# Patient Record
Sex: MALE | Race: WHITE | NOT HISPANIC OR LATINO | URBAN - METROPOLITAN AREA
[De-identification: names, ages, dates, MRNs, and addresses within clinical notes are randomized per-mention and may not be internally consistent; named-entity substitution may affect disease eponyms.]

---

## 2020-01-06 ENCOUNTER — COMMUNICATION - HEALTHEAST (OUTPATIENT)
Dept: TELEHEALTH | Facility: CLINIC | Age: 35
End: 2020-01-06

## 2020-01-06 ENCOUNTER — OFFICE VISIT - HEALTHEAST (OUTPATIENT)
Dept: FAMILY MEDICINE | Facility: CLINIC | Age: 35
End: 2020-01-06

## 2020-01-06 DIAGNOSIS — R09.81 CHRONIC NASAL CONGESTION: ICD-10-CM

## 2020-01-06 DIAGNOSIS — E66.09 CLASS 2 OBESITY DUE TO EXCESS CALORIES WITHOUT SERIOUS COMORBIDITY WITH BODY MASS INDEX (BMI) OF 35.0 TO 35.9 IN ADULT: ICD-10-CM

## 2020-01-06 DIAGNOSIS — E66.812 CLASS 2 OBESITY DUE TO EXCESS CALORIES WITHOUT SERIOUS COMORBIDITY WITH BODY MASS INDEX (BMI) OF 35.0 TO 35.9 IN ADULT: ICD-10-CM

## 2020-01-06 DIAGNOSIS — Z13.220 SCREENING CHOLESTEROL LEVEL: ICD-10-CM

## 2020-01-06 DIAGNOSIS — R00.2 PALPITATIONS: ICD-10-CM

## 2020-01-06 DIAGNOSIS — Z00.01 ENCOUNTER FOR ROUTINE ADULT HEALTH EXAMINATION WITH ABNORMAL FINDINGS: ICD-10-CM

## 2020-01-06 LAB
ANION GAP SERPL CALCULATED.3IONS-SCNC: 9 MMOL/L (ref 5–18)
BUN SERPL-MCNC: 20 MG/DL (ref 8–22)
CALCIUM SERPL-MCNC: 9.5 MG/DL (ref 8.5–10.5)
CHLORIDE BLD-SCNC: 104 MMOL/L (ref 98–107)
CO2 SERPL-SCNC: 28 MMOL/L (ref 22–31)
CREAT SERPL-MCNC: 0.9 MG/DL (ref 0.7–1.3)
ERYTHROCYTE [DISTWIDTH] IN BLOOD BY AUTOMATED COUNT: 11.5 % (ref 11–14.5)
GFR SERPL CREATININE-BSD FRML MDRD: >60 ML/MIN/1.73M2
GLUCOSE BLD-MCNC: 81 MG/DL (ref 70–125)
HCT VFR BLD AUTO: 51.6 % (ref 40–54)
HGB BLD-MCNC: 17.1 G/DL (ref 14–18)
MCH RBC QN AUTO: 30.1 PG (ref 27–34)
MCHC RBC AUTO-ENTMCNC: 33.1 G/DL (ref 32–36)
MCV RBC AUTO: 91 FL (ref 80–100)
PLATELET # BLD AUTO: 195 THOU/UL (ref 140–440)
PMV BLD AUTO: 9 FL (ref 7–10)
POTASSIUM BLD-SCNC: 4.7 MMOL/L (ref 3.5–5)
RBC # BLD AUTO: 5.66 MILL/UL (ref 4.4–6.2)
SODIUM SERPL-SCNC: 141 MMOL/L (ref 136–145)
TSH SERPL DL<=0.005 MIU/L-ACNC: 1.14 UIU/ML (ref 0.3–5)
WBC: 7.8 THOU/UL (ref 4–11)

## 2020-01-06 ASSESSMENT — MIFFLIN-ST. JEOR: SCORE: 2109.78

## 2020-01-07 ENCOUNTER — AMBULATORY - HEALTHEAST (OUTPATIENT)
Dept: LAB | Facility: CLINIC | Age: 35
End: 2020-01-07

## 2020-01-07 DIAGNOSIS — Z13.220 SCREENING CHOLESTEROL LEVEL: ICD-10-CM

## 2020-01-07 LAB
CHOLEST SERPL-MCNC: 198 MG/DL
FASTING STATUS PATIENT QL REPORTED: YES
HDLC SERPL-MCNC: 57 MG/DL
LDLC SERPL CALC-MCNC: 126 MG/DL
TRIGL SERPL-MCNC: 73 MG/DL

## 2020-01-16 ENCOUNTER — OFFICE VISIT - HEALTHEAST (OUTPATIENT)
Dept: CARDIOLOGY | Facility: CLINIC | Age: 35
End: 2020-01-16

## 2020-01-16 DIAGNOSIS — R55 NEAR SYNCOPE: ICD-10-CM

## 2020-01-16 DIAGNOSIS — R00.2 PALPITATIONS: ICD-10-CM

## 2020-01-16 DIAGNOSIS — R00.0 TACHYCARDIA: ICD-10-CM

## 2020-01-16 LAB
ATRIAL RATE - MUSE: 76 BPM
DIASTOLIC BLOOD PRESSURE - MUSE: NORMAL
INTERPRETATION ECG - MUSE: NORMAL
P AXIS - MUSE: 66 DEGREES
PR INTERVAL - MUSE: 154 MS
QRS DURATION - MUSE: 86 MS
QT - MUSE: 364 MS
QTC - MUSE: 409 MS
R AXIS - MUSE: 30 DEGREES
SYSTOLIC BLOOD PRESSURE - MUSE: NORMAL
T AXIS - MUSE: 19 DEGREES
VENTRICULAR RATE- MUSE: 76 BPM

## 2020-01-28 ENCOUNTER — HOSPITAL ENCOUNTER (OUTPATIENT)
Dept: CARDIOLOGY | Facility: HOSPITAL | Age: 35
Discharge: HOME | End: 2020-01-28
Attending: INTERNAL MEDICINE

## 2020-01-28 DIAGNOSIS — R00.2 PALPITATIONS: ICD-10-CM

## 2020-01-28 DIAGNOSIS — R55 NEAR SYNCOPE: ICD-10-CM

## 2020-01-28 DIAGNOSIS — R00.0 TACHYCARDIA: ICD-10-CM

## 2020-01-28 LAB
AORTIC ROOT: 3.6 CM
AORTIC VALVE MEAN VELOCITY: 87.3 CM/S
AV CUSP SEPERATION: 2.5 CM
AV CUSP SEPERATION: 2.5 CM
AV DIMENSIONLESS INDEX VTI: 1
AV MEAN GRADIENT: 3 MMHG
AV PEAK GRADIENT: 6.2 MMHG
AV VALVE AREA: 4.3 CM2
AV VELOCITY RATIO: 1
BSA FOR ECHO PROCEDURE: 2.4 M2
CV ECHO HEIGHT: 70.8 IN
CV ECHO WEIGHT: 255 LBS
DOP CALC AO PEAK VEL: 124 CM/S
DOP CALC AO VTI: 25.7 CM
DOP CALC LVOT AREA: 4.15 CM2
DOP CALC LVOT DIAMETER: 2.3 CM
DOP CALC LVOT PEAK VEL: 124 CM/S
DOP CALC LVOT STROKE VOLUME: 110.5 CM3
DOP CALC MV VTI: 24 CM
DOP CALCLVOT PEAK VEL VTI: 26.6 CM
EJECTION FRACTION: 60 % (ref 55–75)
FRACTIONAL SHORTENING: 29.3 % (ref 28–44)
INTERVENTRICULAR SEPTUM IN END DIASTOLE: 1.05 CM (ref 0.6–1)
IVS/PW RATIO: 1.1
LA AREA 1: 17.9 CM2
LA AREA 2: 19.8 CM2
LEFT ATRIUM LENGTH: 5.3 CM
LEFT ATRIUM SIZE: 4.3 CM
LEFT ATRIUM VOLUME INDEX: 23.7 ML/M2
LEFT ATRIUM VOLUME: 56.8 ML
LEFT VENTRICLE CARDIAC INDEX: 2.9 L/MIN/M2
LEFT VENTRICLE CARDIAC OUTPUT: 6.8 L/MIN
LEFT VENTRICLE DIASTOLIC VOLUME INDEX: 27.8 CM3/M2 (ref 34–74)
LEFT VENTRICLE DIASTOLIC VOLUME: 66.8 CM3 (ref 62–150)
LEFT VENTRICLE HEART RATE: 62 BPM
LEFT VENTRICLE MASS INDEX: 66.4 G/M2
LEFT VENTRICLE SYSTOLIC VOLUME INDEX: 11.2 CM3/M2 (ref 11–31)
LEFT VENTRICLE SYSTOLIC VOLUME: 26.9 CM3 (ref 21–61)
LEFT VENTRICULAR INTERNAL DIMENSION IN DIASTOLE: 4.54 CM (ref 4.2–5.8)
LEFT VENTRICULAR INTERNAL DIMENSION IN SYSTOLE: 3.21 CM (ref 2.5–4)
LEFT VENTRICULAR MASS: 159.4 G
LEFT VENTRICULAR OUTFLOW TRACT MEAN GRADIENT: 3 MMHG
LEFT VENTRICULAR OUTFLOW TRACT MEAN VELOCITY: 88 CM/S
LEFT VENTRICULAR OUTFLOW TRACT PEAK GRADIENT: 6 MMHG
LEFT VENTRICULAR POSTERIOR WALL IN END DIASTOLE: 0.99 CM (ref 0.6–1)
LV STROKE VOLUME INDEX: 46 ML/M2
MITRAL VALVE DECELERATION SLOPE: 2720 MM/S2
MITRAL VALVE E/A RATIO: 1.9
MITRAL VALVE MEAN INFLOW VELOCITY: 39.3 CM/S
MITRAL VALVE PEAK VELOCITY: 91.4 CM/S
MITRAL VALVE PRESSURE HALF-TIME: 74 MS
MV AREA VTI: 4.6 CM2
MV AVERAGE E/E' RATIO: 6.3 CM/S
MV DECELERATION TIME: 162 MS
MV E'TISSUE VEL-LAT: 15.6 CM/S
MV E'TISSUE VEL-MED: 10.7 CM/S
MV LATERAL E/E' RATIO: 5.3
MV MEAN GRADIENT: 1 MMHG
MV MEDIAL E/E' RATIO: 7.7
MV PEAK A VELOCITY: 43.2 CM/S
MV PEAK E VELOCITY: 82.9 CM/S
MV PEAK GRADIENT: 3.3 MMHG
MV VALVE AREA BY CONTINUITY EQUATION: 4.6 CM2
MV VALVE AREA PRESSURE 1/2 METHOD: 3 CM2
NUC REST DIASTOLIC VOLUME INDEX: 4080 LBS
NUC REST SYSTOLIC VOLUME INDEX: 70.75 IN
TRICUSPID VALVE ANULAR PLANE SYSTOLIC EXCURSION: 2.7 CM

## 2020-01-28 ASSESSMENT — MIFFLIN-ST. JEOR: SCORE: 2114.83

## 2020-01-29 ENCOUNTER — COMMUNICATION - HEALTHEAST (OUTPATIENT)
Dept: CARDIOLOGY | Facility: CLINIC | Age: 35
End: 2020-01-29

## 2020-10-14 ENCOUNTER — COMMUNICATION - HEALTHEAST (OUTPATIENT)
Dept: FAMILY MEDICINE | Facility: CLINIC | Age: 35
End: 2020-10-14

## 2021-02-26 ENCOUNTER — OFFICE VISIT - HEALTHEAST (OUTPATIENT)
Dept: FAMILY MEDICINE | Facility: CLINIC | Age: 36
End: 2021-02-26

## 2021-02-26 DIAGNOSIS — R00.2 PALPITATIONS: ICD-10-CM

## 2021-02-26 DIAGNOSIS — J34.89 NASAL OBSTRUCTION: ICD-10-CM

## 2021-02-26 DIAGNOSIS — R03.0 ELEVATED BLOOD PRESSURE READING WITHOUT DIAGNOSIS OF HYPERTENSION: ICD-10-CM

## 2021-02-26 LAB
ALBUMIN SERPL-MCNC: 4.3 G/DL (ref 3.5–5)
ALP SERPL-CCNC: 62 U/L (ref 45–120)
ALT SERPL W P-5'-P-CCNC: 56 U/L (ref 0–45)
ANION GAP SERPL CALCULATED.3IONS-SCNC: 9 MMOL/L (ref 5–18)
AST SERPL W P-5'-P-CCNC: 28 U/L (ref 0–40)
BASOPHILS # BLD AUTO: 0.1 THOU/UL (ref 0–0.2)
BASOPHILS NFR BLD AUTO: 1 % (ref 0–2)
BILIRUB SERPL-MCNC: 0.9 MG/DL (ref 0–1)
BUN SERPL-MCNC: 19 MG/DL (ref 8–22)
CALCIUM SERPL-MCNC: 9.7 MG/DL (ref 8.5–10.5)
CHLORIDE BLD-SCNC: 102 MMOL/L (ref 98–107)
CO2 SERPL-SCNC: 28 MMOL/L (ref 22–31)
CREAT SERPL-MCNC: 0.84 MG/DL (ref 0.7–1.3)
EOSINOPHIL # BLD AUTO: 0.2 THOU/UL (ref 0–0.4)
EOSINOPHIL NFR BLD AUTO: 3 % (ref 0–6)
ERYTHROCYTE [DISTWIDTH] IN BLOOD BY AUTOMATED COUNT: 12.1 % (ref 11–14.5)
GFR SERPL CREATININE-BSD FRML MDRD: >60 ML/MIN/1.73M2
GLUCOSE BLD-MCNC: 83 MG/DL (ref 70–125)
HCT VFR BLD AUTO: 52.5 % (ref 40–54)
HGB BLD-MCNC: 18.5 G/DL (ref 14–18)
IMM GRANULOCYTES # BLD: 0 THOU/UL
IMM GRANULOCYTES NFR BLD: 0 %
LYMPHOCYTES # BLD AUTO: 1.4 THOU/UL (ref 0.8–4.4)
LYMPHOCYTES NFR BLD AUTO: 19 % (ref 20–40)
MCH RBC QN AUTO: 29.4 PG (ref 27–34)
MCHC RBC AUTO-ENTMCNC: 35.2 G/DL (ref 32–36)
MCV RBC AUTO: 84 FL (ref 80–100)
MONOCYTES # BLD AUTO: 0.7 THOU/UL (ref 0–0.9)
MONOCYTES NFR BLD AUTO: 10 % (ref 2–10)
NEUTROPHILS # BLD AUTO: 5.1 THOU/UL (ref 2–7.7)
NEUTROPHILS NFR BLD AUTO: 68 % (ref 50–70)
PLATELET # BLD AUTO: 221 THOU/UL (ref 140–440)
PMV BLD AUTO: 10.8 FL (ref 7–10)
POTASSIUM BLD-SCNC: 4.8 MMOL/L (ref 3.5–5)
PROT SERPL-MCNC: 7.3 G/DL (ref 6–8)
RBC # BLD AUTO: 6.29 MILL/UL (ref 4.4–6.2)
SODIUM SERPL-SCNC: 139 MMOL/L (ref 136–145)
TSH SERPL DL<=0.005 MIU/L-ACNC: 1.62 UIU/ML (ref 0.3–5)
WBC: 7.5 THOU/UL (ref 4–11)

## 2021-02-26 ASSESSMENT — MIFFLIN-ST. JEOR: SCORE: 2109.73

## 2021-03-01 LAB — TESTOST SERPL-MCNC: 635 NG/DL (ref 240–871)

## 2021-06-04 VITALS
HEART RATE: 85 BPM | OXYGEN SATURATION: 96 % | SYSTOLIC BLOOD PRESSURE: 108 MMHG | DIASTOLIC BLOOD PRESSURE: 62 MMHG | BODY MASS INDEX: 35.89 KG/M2 | WEIGHT: 255.13 LBS | RESPIRATION RATE: 16 BRPM

## 2021-06-04 VITALS — WEIGHT: 255 LBS | HEIGHT: 71 IN | BODY MASS INDEX: 35.7 KG/M2

## 2021-06-04 VITALS
HEIGHT: 71 IN | WEIGHT: 254.06 LBS | RESPIRATION RATE: 16 BRPM | BODY MASS INDEX: 35.57 KG/M2 | HEART RATE: 72 BPM | SYSTOLIC BLOOD PRESSURE: 122 MMHG | TEMPERATURE: 98.8 F | DIASTOLIC BLOOD PRESSURE: 70 MMHG

## 2021-06-05 VITALS
BODY MASS INDEX: 35.42 KG/M2 | DIASTOLIC BLOOD PRESSURE: 82 MMHG | OXYGEN SATURATION: 96 % | SYSTOLIC BLOOD PRESSURE: 132 MMHG | WEIGHT: 253 LBS | HEART RATE: 76 BPM | HEIGHT: 71 IN

## 2021-06-15 NOTE — PROGRESS NOTES
" Patient ID: Joao Blood is a 35 y.o. male.  /82   Pulse 76   Ht 5' 11\" (1.803 m)   Wt (!) 253 lb (114.8 kg)   SpO2 96%   BMI 35.29 kg/m      Assessment/Plan:                   Diagnoses and all orders for this visit:    Nasal obstruction  -     Ambulatory referral to ENT    Palpitations  -     Comprehensive Metabolic Panel  -     HM1(CBC and Differential)  -     Thyroid Cascade  -     Testosterone, Total    Elevated blood pressure reading without diagnosis of hypertension  -     Testosterone, Total    Other orders  -     Cancel: Lipid Cascade  -     Cancel: Glucose           DISCUSSION  He would likely benefit tremendously from having further evaluation and treatment of his PTSD which she will plan to do through the [facility].  Discussed options for pharmacologic therapy for mental health concerns and/or blood pressure concerns.  Will not pursue that at this time.  Discussed obtaining basic laboratory work as noted above to make sure that there is not pathology present where we should intervene in a specific manner.  I think it is unlikely that there is a significant underlying metabolic concern.  Discussed that we will need to engage in further work-up should he continue to have these episodes.  High suspicion that episodes are a stress response based on his description.    Referral to ENT for likely fixed nasal obstruction, possibly polyp although no confirmation on exam today.  Subjective:     HPI    Joao Blood is a 35 y.o. male is here today to discuss some ongoing concerns related to his health.  He is a   describes that he has had a total of 9 deployments since 2006.  He has been in the [employer] and is currently in [place]  [employer].    He reports he has had nasal congestion that has been ongoing now since his last appointment.  He reports it seems to be a fixed obstruction in his nasal passageway on the left.  He states he was advised to use an antihistamine or nasal spray " "which was not helpful.  He would like to have this further evaluated.    He reports episodes of hypertension, feeling of anxiety, sometimes lightheadedness.  Symptoms sometimes last for several days.  He describes having a first episode in approximately 2013 and then having another episode that was significant in January 2020.  He saw Dr. Perez who ordered laboratory testing, an echocardiogram, and EKG as well as a month-long cardiac monitor.  No definitive pathology was found.  Patient is concerned by these episodes.  He does describe having a feeling of anxiety at times.  He describes to me that he was diagnosed with PTSD at the [facility].  He is currently not undergoing any type of active treatment.  He was in the past placed on propranolol for similar type of symptoms but he did not stick with the medication.  Reviewed his clinical course in great depth and discussed options for moving forward.    Review of Systems  Complete review of systems is obtained.  Other than the specific considerations noted above complete review of systems is negative.          Objective:   Medications:  Current Outpatient Medications   Medication Sig     cholecalciferol, vitamin D3, (VITAMIN D3) 25 mcg (1,000 unit) capsule Take 1,000 Units by mouth daily.     multivitamin (MULTIPLE VITAMIN ESSENTIAL ORAL) Take by mouth daily.       Allergies:  No Known Allergies    Tobacco:   reports that he has quit smoking. His smoking use included cigars. He has never used smokeless tobacco.     Physical Exam          /82   Pulse 76   Ht 5' 11\" (1.803 m)   Wt (!) 253 lb (114.8 kg)   SpO2 96%   BMI 35.29 kg/m            General Appearance:    Alert, cooperative, no distress   Eyes:   No scleral icterus or conjunctival irritation       Ears:    Normal TM's and external ear canals, both ears   Throat:   Lips, mucosa, and tongue normal; teeth and gums normal   Neck:   Supple, symmetrical, trachea midline, no adenopathy;        " thyroid:  No enlargement/tenderness/nodules   Lungs:     Clear to auscultation bilaterally, respirations unlabored, no wheezes or crackles   Heart:    Regular rate and rhythm,  No murmur   Extremities:  No edema, no joint swelling or redness, no evidence of any injuries   Skin:  No concerning skin findings, no suspicious moles, no rashes   Neurologic:  On gross examination there is no motor or sensory deficit.  Patient walks with a normal gait

## 2021-06-16 PROBLEM — E66.09 CLASS 2 OBESITY DUE TO EXCESS CALORIES WITHOUT SERIOUS COMORBIDITY WITH BODY MASS INDEX (BMI) OF 35.0 TO 35.9 IN ADULT: Status: ACTIVE | Noted: 2020-01-06

## 2021-06-16 PROBLEM — R55 NEAR SYNCOPE: Status: ACTIVE | Noted: 2020-01-16

## 2021-06-16 PROBLEM — R00.2 PALPITATIONS: Status: ACTIVE | Noted: 2020-01-16

## 2021-06-16 PROBLEM — E66.812 CLASS 2 OBESITY DUE TO EXCESS CALORIES WITHOUT SERIOUS COMORBIDITY WITH BODY MASS INDEX (BMI) OF 35.0 TO 35.9 IN ADULT: Status: ACTIVE | Noted: 2020-01-06

## 2021-06-16 PROBLEM — R00.0 TACHYCARDIA: Status: ACTIVE | Noted: 2020-01-16

## 2021-06-16 NOTE — TELEPHONE ENCOUNTER
Telephone Encounter by Hattie Lee RN at 2/6/2020 11:49 AM     Author: Hattie Lee RN Service: -- Author Type: Registered Nurse    Filed: 2/6/2020 11:53 AM Encounter Date: 1/29/2020 Status: Signed    : Hattie Lee RN (Registered Nurse)         Patient contacted with echo results today, he denies s/s of dizziness, lightheadedness, palpitations or presyncope. Reports running daily without issues noted. He will continue to wear the SETH monitor through 2/28, he was advised that he would be contacted with analysis /recommendations when available. sk/RN                  Marco Galvin MD Blackledge, Shayy J, RN   Caller: Unspecified (1 week ago)             Okay.  Thanks, will await final report.    Has he been having any symptoms?    Thanks,    Marco

## 2021-06-16 NOTE — TELEPHONE ENCOUNTER
Telephone Encounter by Shayy Loomis RN at 1/29/2020  8:52 AM     Author: Shayy Loomis RN Service: -- Author Type: Registered Nurse    Filed: 1/29/2020  8:59 AM Encounter Date: 1/29/2020 Status: Signed    : Shayy Loomis RN (Registered Nurse)       Contacted patient to review symptoms for [employer] Notification Event. Patient reports he triggered monitor to capture heart rate during his run yesterday afternoon. He denies any symptoms. Strips below are for your review:

## 2021-06-27 ENCOUNTER — HEALTH MAINTENANCE LETTER (OUTPATIENT)
Age: 36
End: 2021-06-27

## 2021-06-28 NOTE — PROGRESS NOTES
Progress Notes by Celso Karimi DO at 1/6/2020  1:40 PM     Author: Celso Karimi DO Service: -- Author Type: Physician    Filed: 1/6/2020  3:11 PM Encounter Date: 1/6/2020 Status: Signed    : Celso Karimi DO (Physician)       MALE PREVENTATIVE EXAM    Assessment and Plan:       1. Encounter for routine adult health examination with abnormal findings  I encouraged him to eat healthy foods and keep getting regular exercise.  He is going to return to the clinic for fasting cholesterol panel.  He is up-to-date on his immunizations.    2. Palpitations  I expressed my concern about the episodic heart palpitations and near syncope symptoms he has been experiencing.  The underlying cause is unclear, but this could be due to atrial fibrillation.  I recommended that we check the labs as listed below and he was given a referral to cardiology.  I anticipate they will want to do some type of heart monitor testing as part of his work-up.  - Basic Metabolic Panel  - HM2(CBC w/o Differential)  - Thyroid [place]  - Ambulatory referral to Cardiology    3. Chronic nasal congestion  We discussed his chronic nasal congestion symptoms that he has been experiencing since being deployed in [facility].  I recommended he try Flonase.  If symptoms do not improve he will let me know and we will consider referring to an ENT specialist.  - fluticasone propionate (FLONASE ALLERGY RELIEF) 50 mcg/actuation nasal spray; 1 spray into each nostril daily.  Dispense: 16 g; Refill: 2    4. Class 2 obesity due to excess calories without serious comorbidity with body mass index (BMI) of 35.0 to 35.9 in adult  He will continue to work on eating healthy foods and getting regular exercise.    5. Screening cholesterol level  He is going to return to the clinic for fasting cholesterol panel.  - Lipid Cascade; Future     Next follow up:  No follow-ups on file.    Immunization Review  Adult Imm Review: No  immunizations due today        I discussed the following with the patient:   Adult Healthy Living: Importance of regular exercise  Healthy nutrition        Subjective:   Chief Complaint: Joao Blood is an 34 y.o. male here for a preventative health visit.     HPI: He denies concerns regarding hearing, vision, urination, bowel movements, sleep or mood.  He is primarily concerned today about episodic heart palpitations he has been experiencing over the past year.  He reports having random spikes in his heart rate that seem to occur out of the blue.  Sometimes this is associated with a feeling that he may pass out.  He first noticed a couple of episodes like this in 2013 which went away on their own.  This past year he was stationed in [facility] and had a spell during a meeting.  He reports feeling awful over the subsequent couple of days.  He saw  to the  who ordered blood tests and started him on propranolol because his blood pressure was running high.  He reports that the work-up was negative and he ended up stopping the propranolol.  He states that his blood pressure will run anywhere from the 70s and then jump up into the 120s-130s.  He is able to monitor this with his watch.  This past August he had another spell.  At that time he had a normal EKG.  He is not having shortness of breath symptoms or chest pain.  He is still able to exercise without difficulty.  He does not feel like stress or dehydration are contributing to these symptoms.    He is also concerned about chronic nasal congestion symptoms he has been experiencing since returning from [facility] in May 2019.  He reports that several of his colleagues have had similar symptoms.  He has not tried any nasal sprays or oral medications for this.  He denies having history of allergies.    Social history: , no children.  He is currently in the [employer].  He used to be in the [employer].  He also works as a [id number] work.    Healthy  "Habits  Are you taking a daily aspirin? No  Do you typically exercising at least 40 min, 3-4 times per week?  Yes  Do you usually eat at least 4 servings of fruit and vegetables a day, include whole grains and fiber and avoid regularly eating high fat foods? Yes  Have you had an eye exam in the past two years? NO  Do you see a dentist twice per year? Yes  Do you have any concerns regarding sleep? No    Safety Screen  If you own firearms, are they secured in a locked gun cabinet or with trigger locks? Yes  Do you feel you are safe where you are living?: Yes (1/6/2020  1:46 PM)  Do you feel you are safe in your relationship(s)?: Yes (1/6/2020  1:46 PM)      Review of Systems:  Please see above.  The rest of the review of systems are negative for all systems.     Cancer Screening     Patient has no health maintenance due at this time              History     Reviewed By Date/Time Sections Reviewed    Jade Hein CMA 1/6/2020  1:47 PM Tobacco            Objective:   Vital Signs:   Visit Vitals  /70 (Patient Site: Left Arm, Patient Position: Sitting, Cuff Size: Adult Large)   Pulse 72   Temp 98.8  F (37.1  C) (Oral)   Resp 16   Ht 5' 10.7\" (1.796 m)   Wt (!) 254 lb 1 oz (115.2 kg)   BMI 35.74 kg/m           PHYSICAL EXAM  General Appearance: Alert, cooperative, no distress, appears stated age  Head: Normocephalic, without obvious abnormality, atraumatic  Eyes: PERRL, conjunctiva/corneas clear, EOM's intact  Ears: Normal TM's and external ear canals, both ears  Nose: Mildly congested  Throat: Lips, mucosa, and tongue normal; teeth and gums normal  Neck: Supple, symmetrical, trachea midline, no adenopathy;  thyroid: not enlarged, symmetric, no tenderness/mass/nodules  Back: Symmetric, no curvature, ROM normal, no CVA tenderness  Lungs: Clear to auscultation bilaterally, respirations unlabored  Heart: Regular rate and rhythm, S1 and S2 normal, no murmur, rub, or gallop,  Abdomen: Soft, non-tender, bowel sounds " active all four quadrants,  no masses, no organomegaly  Musculoskeletal: Normal range of motion. No joint swelling or deformity.   Extremities: Extremities normal, atraumatic, no cyanosis or edema  Skin: Skin color, texture, turgor normal, no rashes or lesions  Lymph nodes: Cervical, supraclavicular, and axillary nodes normal  Neurologic: He is alert.  Normal speech.  No focal deficits.  Normal deep tendon reflexes.   Psychiatric: He has a normal mood and affect.              Medication List          Accurate as of January 6, 2020  3:10 PM. If you have any questions, ask your nurse or doctor.            START taking these medications    fluticasone propionate 50 mcg/actuation nasal spray  Also known as:  Flonase Allergy Relief  INSTRUCTIONS:  1 spray into each nostril daily.  Started by:  Celso Patten, DO           CONTINUE taking these medications    MULTIPLE VITAMIN ESSENTIAL ORAL  INSTRUCTIONS:  Take by mouth daily.              Where to Get Your Medications      These medications were sent to [place] [id number] [place] [address] [address]    Phone:  [phone]     fluticasone propionate 50 mcg/actuation nasal spray         Additional Screenings Completed Today:

## 2021-06-28 NOTE — PROGRESS NOTES
Progress Notes by Marco Galvin MD at 1/16/2020  1:50 PM     Author: Marco Galvin MD Service: -- Author Type: Physician    Filed: 1/16/2020  2:34 PM Encounter Date: 1/16/2020 Status: Signed    : Marco Galvin MD (Physician)         Thank you, Dr. Ana Patten, for asking the [facility] team to see Mr. Joao Blood to evaluate near syncope and tachycardia.      Assessment/Recommendations   Patient with periodic episodes of a racing heartbeat associated with near syncope.  He has not had a amparo syncopal episode but feels like his come close on several occasions.  The onset is abrupt would suggest the possibility of a rhythm disturbance.    We will check a 12-lead ECG today to make sure he does not have evidence of preexcitation.  We will check an echocardiogram to ensure that he does not have any structural issues with his heart.  We will also check a 30-day event recorder to see if we can capture his rhythm disturbance.  We also talked about the possibility of an Apple 4 watch but he is inclined to go with a 30-day event recorder at this time which seems quite reasonable.    We will get back to him with the results of these tests and any further recommendations.    Thank you for allowing us to participate in his care.       History of Present Illness/Subjective    Mr. Joao Blood is a 34 y.o. male with no known history of heart problems but has noted intermittently over the years episodes of near syncope associated with tachycardia.  About 6 years ago in 2013 he had his first episode.  It happened about 2-3 times at that time and he nearly passed out, the symptoms be for 5 minutes and he would feel like he was very lightheaded and hit that his heart was racing.  He also had this while working in [employer].  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  He has had 1 bad episode and quite that lasted 20 to 30 minutes.  He was  evaluated there and he said he had an EKG which was unremarkable.  He was given some propranolol which she took for a couple of days and then stopped it.  He had a high blood pressure on his initial evaluation there and that was the reason for the propranolol.    He had more episodes in August of this year and then about a month ago he notices heart racing with similar episodes as well.  He gets milder episodes more frequently in the night.    He denies orthopnea, paroxysmal nocturnal dyspnea, peripheral edema, or amparo syncopal episodes.  He has no history rheumatic fever, cerebrovascular accident or TIA.  He does not get chest discomfort.  He works out at a gym, often for quite vigorously.  One episode occurred after working out at a gym.    He is not diabetic, does not have hypertension, does not smoke, and does not have a family history of premature coronary artery disease.  Lipid panel as noted below.        ECG: Personally reviewed. {Pending.       Physical Examination Review of Systems   Vitals:    01/16/20 1350   BP: 108/62   Pulse: 85   Resp: 16   SpO2: 96%     Body mass index is 35.89 kg/m .  Wt Readings from Last 3 Encounters:   01/16/20 (!) 255 lb 2 oz (115.7 kg)   01/06/20 (!) 254 lb 1 oz (115.2 kg)     General Appearance:   Alert, cooperative and in no acute distress.   ENT/Mouth: Oral mucuos membranes pink and moist .      EYES:  no scleral icterus, normal conjunctivae   Neck: JVP normal. No Hepatojugular reflux. Thyroid not visualized.   Chest/Lungs:   Lungs are clear to auscultation, equal chest wall expansion.   Cardiovascular:   S1, S2 without murmur ,clicks or rubs. Brachial, radial and posterior tibial pulses are intact and symetric. No carotid bruits noted   Abdomen:  Nontender. BS+.    Extremities: No cyanosis, clubbing or edema   Skin: no xanthelasma, warm.    Neurologic: normal  bilateral, no tremors     Psychiatric: Appropriate affect.      General: WNL  Eyes: WNL  Ears/Nose/Throat:  WNL  Lungs: WNL  Heart: WNL  Stomach: WNL  Bladder: WNL  Muscle/Joints: WNL  Skin: WNL  Nervous System: WNL  Mental Health: WNL     Blood: WNL       Medical History  Surgical History Family History Social History   No past medical history on file. Past Surgical History:   Procedure Laterality Date   ? EXCESSIVE THIGH / HIP / BUTTOCK / FLANK SKIN EXCISION  2011    Excess chest and flank skin excision after weight loss    Family History   Problem Relation Age of Onset   ? Iron deficiency Mother    ? No Medical Problems Father    ? Prostate cancer Paternal Grandfather     Social History     Socioeconomic History   ? Marital status:      Spouse name: Not on file   ? Number of children: Not on file   ? Years of education: Not on file   ? Highest education level: Not on file   Occupational History   ? Not on file   Social Needs   ? Financial resource strain: Not on file   ? Food insecurity:     Worry: Not on file     Inability: Not on file   ? Transportation needs:     Medical: Not on file     Non-medical: Not on file   Tobacco Use   ? Smoking status: Current Some Day Smoker     Types: Cigars   ? Smokeless tobacco: Never Used   Substance and Sexual Activity   ? Alcohol use: Yes   ? Drug use: Never   ? Sexual activity: Not on file   Lifestyle   ? Physical activity:     Days per week: Not on file     Minutes per session: Not on file   ? Stress: Not on file   Relationships   ? Social connections:     Talks on phone: Not on file     Gets together: Not on file     Attends Sabianist service: Not on file     Active member of club or organization: Not on file     Attends meetings of clubs or organizations: Not on file     Relationship status: Not on file   ? Intimate partner violence:     Fear of current or ex partner: Not on file     Emotionally abused: Not on file     Physically abused: Not on file     Forced sexual activity: Not on file   Other Topics Concern   ? Not on file   Social History Narrative   ? Not on file           Medications  Allergies   Current Outpatient Medications   Medication Sig Dispense Refill   ? fluticasone propionate (FLONASE ALLERGY RELIEF) 50 mcg/actuation nasal spray 1 spray into each nostril daily. 16 g 2   ? multivitamin (MULTIPLE VITAMIN ESSENTIAL ORAL) Take by mouth daily.       No current facility-administered medications for this visit.     No Known Allergies      Lab Results    Chemistry/lipid CBC Cardiac Enzymes/BNP/TSH/INR   Lab Results   Component Value Date    CHOL 198 01/07/2020    HDL 57 01/07/2020    LDLCALC 126 01/07/2020    TRIG 73 01/07/2020    CREATININE 0.90 01/06/2020    BUN 20 01/06/2020    K 4.7 01/06/2020     01/06/2020     01/06/2020    CO2 28 01/06/2020    Lab Results   Component Value Date    WBC 7.8 01/06/2020    HGB 17.1 01/06/2020    HCT 51.6 01/06/2020    MCV 91 01/06/2020     01/06/2020    Lab Results   Component Value Date    TSH 1.14 01/06/2020

## 2021-10-16 ENCOUNTER — HEALTH MAINTENANCE LETTER (OUTPATIENT)
Age: 36
End: 2021-10-16

## 2021-12-13 ENCOUNTER — IMMUNIZATION (OUTPATIENT)
Dept: NURSING | Facility: CLINIC | Age: 36
End: 2021-12-13
Payer: OTHER GOVERNMENT

## 2021-12-13 PROCEDURE — 0064A COVID-19,PF,MODERNA (18+ YRS BOOSTER .25ML): CPT

## 2021-12-13 PROCEDURE — 91306 COVID-19,PF,MODERNA (18+ YRS BOOSTER .25ML): CPT

## 2022-02-21 ENCOUNTER — OFFICE VISIT (OUTPATIENT)
Dept: FAMILY MEDICINE | Facility: CLINIC | Age: 37
End: 2022-02-21
Payer: COMMERCIAL

## 2022-02-21 VITALS
DIASTOLIC BLOOD PRESSURE: 72 MMHG | SYSTOLIC BLOOD PRESSURE: 118 MMHG | HEART RATE: 92 BPM | WEIGHT: 281 LBS | HEIGHT: 70 IN | BODY MASS INDEX: 40.23 KG/M2

## 2022-02-21 DIAGNOSIS — Z00.00 ROUTINE MEDICAL EXAM: Primary | ICD-10-CM

## 2022-02-21 DIAGNOSIS — E66.01 MORBID OBESITY (H): ICD-10-CM

## 2022-02-21 DIAGNOSIS — J30.2 SEASONAL ALLERGIC RHINITIS, UNSPECIFIED TRIGGER: ICD-10-CM

## 2022-02-21 PROBLEM — E66.09 CLASS 2 OBESITY DUE TO EXCESS CALORIES WITHOUT SERIOUS COMORBIDITY WITH BODY MASS INDEX (BMI) OF 35.0 TO 35.9 IN ADULT: Status: RESOLVED | Noted: 2020-01-06 | Resolved: 2022-02-21

## 2022-02-21 PROBLEM — E66.812 CLASS 2 OBESITY DUE TO EXCESS CALORIES WITHOUT SERIOUS COMORBIDITY WITH BODY MASS INDEX (BMI) OF 35.0 TO 35.9 IN ADULT: Status: RESOLVED | Noted: 2020-01-06 | Resolved: 2022-02-21

## 2022-02-21 PROBLEM — R00.0 TACHYCARDIA: Status: RESOLVED | Noted: 2020-01-16 | Resolved: 2022-02-21

## 2022-02-21 PROCEDURE — 99395 PREV VISIT EST AGE 18-39: CPT | Performed by: FAMILY MEDICINE

## 2022-02-21 ASSESSMENT — PATIENT HEALTH QUESTIONNAIRE - PHQ9
SUM OF ALL RESPONSES TO PHQ QUESTIONS 1-9: 0
5. POOR APPETITE OR OVEREATING: NOT AT ALL

## 2022-02-21 ASSESSMENT — ANXIETY QUESTIONNAIRES
2. NOT BEING ABLE TO STOP OR CONTROL WORRYING: NOT AT ALL
IF YOU CHECKED OFF ANY PROBLEMS ON THIS QUESTIONNAIRE, HOW DIFFICULT HAVE THESE PROBLEMS MADE IT FOR YOU TO DO YOUR WORK, TAKE CARE OF THINGS AT HOME, OR GET ALONG WITH OTHER PEOPLE: NOT DIFFICULT AT ALL
5. BEING SO RESTLESS THAT IT IS HARD TO SIT STILL: NOT AT ALL
7. FEELING AFRAID AS IF SOMETHING AWFUL MIGHT HAPPEN: NOT AT ALL
GAD7 TOTAL SCORE: 0
6. BECOMING EASILY ANNOYED OR IRRITABLE: NOT AT ALL
1. FEELING NERVOUS, ANXIOUS, OR ON EDGE: NOT AT ALL
3. WORRYING TOO MUCH ABOUT DIFFERENT THINGS: NOT AT ALL

## 2022-02-21 NOTE — PROGRESS NOTES
"  ASSESSMENT/PLAN:   (Z00.00) Routine medical exam  (primary encounter diagnosis)  Comment: Generally the patient is doing very well.  We discussed healthy lifestyles, including adequate exercise (3-4 times a week for 20-30 minutes), and a healthy diet.  Patient should return for annual physicals, and we can also see them here as needed.       (E66.01) Morbid obesity (H)  Comment: We talked about weight loss today as a general concept with him and is trying to work on exercise daily if he is able to, along with decreasing his overall caloric intake hydrates. We can keep an eye on that going forward.      (J30.2) Seasonal allergic rhinitis, unspecified trigger  Comment: He was initially looking for a referral for deviated septum he has a terrible issue with that, but I think more he has some allergic rhinitis and possibly even some nasal polyposis so I suggested that he try some Flonase daily for a month or so and see how that works, if that works he can continue to do that but if he does not have any luck with that but he will let me know and I can send him to ENT.  Plan:     Patient has been advised of split billing requirements and indicates understanding: Yes    COUNSELING:   Reviewed preventive health counseling, as reflected in patient instructions       Regular exercise       Healthy diet/nutrition       Alcohol Use     Estimated body mass index is 35.29 kg/m  as calculated from the following:    Height as of 2/26/21: 1.803 m (5' 11\").    Weight as of 2/26/21: 114.8 kg (253 lb).     Weight management plan: Discussed healthy diet and exercise guidelines    He reports that he has quit smoking. His smoking use included cigars. He has never used smokeless tobacco.          SUBJECTIVE:   CC: Joao Blood is an 36 year old male who presents for preventative health visit.       Patient has been advised of split billing requirements and indicates understanding: Yes  Healthy Habits:     Getting at least 3 servings of " Calcium per day:  Yes    Bi-annual eye exam:  NO    Dental care twice a year:  NO    Sleep apnea or symptoms of sleep apnea:  None    Diet:  Regular (no restrictions)    Frequency of exercise:  4-5 days/week    Duration of exercise:  45-60 minutes    Taking medications regularly:  Not Applicable    Medication side effects:  Not applicable    PHQ-2 Total Score: 0    Additional concerns today:  No              Today's PHQ-2 Score:   PHQ-2 ( 1999 [employer]) 2/21/2022   Q1: Little interest or pleasure in doing things 0   Q2: Feeling down, depressed or hopeless 0   PHQ-2 Score 0   Q1: Little interest or pleasure in doing things Not at all   Q2: Feeling down, depressed or hopeless Not at all   PHQ-2 Score 0       Abuse: Current or Past(Physical, Sexual or Emotional)- No  Do you feel safe in your environment? Yes    Have you ever done Advance Care Planning? (For example, a Health Directive, POLST, or a discussion with a medical provider or your loved ones about your wishes): No, advance care planning information given to patient to review.  Patient plans to discuss their wishes with loved ones or provider.      Social History     Tobacco Use     Smoking status: Former Smoker     Types: Cigars     Smokeless tobacco: Never Used   Substance Use Topics     Alcohol use: Yes     Comment: 2-3 /week     If you drink alcohol do you typically have >3 drinks per day or >7 drinks per week? No    Alcohol Use 2/21/2022   Prescreen: >3 drinks/day or >7 drinks/week? No   Prescreen: >3 drinks/day or >7 drinks/week? -   No flowsheet data found.    Last PSA: No results found for: PSA    Reviewed orders with patient. Reviewed health maintenance and updated orders accordingly - Yes  Labs reviewed in EPIC  BP Readings from Last 3 Encounters:   02/21/22 118/72   02/26/21 132/82   01/16/20 108/62    Wt Readings from Last 3 Encounters:   02/21/22 127.5 kg (281 lb)   02/26/21 114.8 kg (253 lb)   01/16/20 115.7 kg (255 lb 2 oz)                  Patient  Active Problem List   Diagnosis     Palpitations     Near syncope     Low back pain     Morbid obesity (H)     Seasonal allergic rhinitis     Past Surgical History:   Procedure Laterality Date     EXCESSIVE THIGH / HIP / BUTTOCK / FLANK SKIN EXCISION  2011    Excess chest and flank skin excision after weight loss       Social History     Tobacco Use     Smoking status: Former Smoker     Types: Cigars     Smokeless tobacco: Never Used   Substance Use Topics     Alcohol use: Yes     Comment: 2-3 /week     Family History   Problem Relation Age of Onset     Iron deficiency Mother      No Known Problems Father      Prostate Cancer Paternal Grandfather          Current Outpatient Medications   Medication Sig Dispense Refill     cholecalciferol, vitamin D3, (VITAMIN D3) 25 mcg (1,000 unit) capsule [CHOLECALCIFEROL, VITAMIN D3, (VITAMIN D3) 25 MCG (1,000 UNIT) CAPSULE] Take 1,000 Units by mouth daily.       multivitamin (MULTIPLE VITAMIN ESSENTIAL ORAL) [MULTIVITAMIN (MULTIPLE VITAMIN ESSENTIAL ORAL)] Take by mouth daily.       No Known Allergies    Reviewed and updated as needed this visit by clinical staff   Tobacco  Allergies  Meds  Problems  Med Hx  Surg Hx  Fam Hx  Soc   Hx        Reviewed and updated as needed this visit by Provider   Tobacco  Allergies  Meds  Problems  Med Hx  Surg Hx  Fam Hx  Soc   Hx       Past Medical History:   Diagnosis Date     Anxiety       Past Surgical History:   Procedure Laterality Date     EXCESSIVE THIGH / HIP / BUTTOCK / FLANK SKIN EXCISION  2011    Excess chest and flank skin excision after weight loss       Review of Systems  CONSTITUTIONAL: NEGATIVE for fever, chills, change in weight  INTEGUMENTARY/SKIN: NEGATIVE for worrisome rashes, moles or lesions  EYES: NEGATIVE for vision changes or irritation  ENT: NEGATIVE for ear, mouth and throat problems  RESP: NEGATIVE for significant cough or SOB  CV: NEGATIVE for chest pain, palpitations or peripheral edema  GI: NEGATIVE  "for nausea, abdominal pain, heartburn, or change in bowel habits   male: negative for dysuria, hematuria, decreased urinary stream, erectile dysfunction, urethral discharge  MUSCULOSKELETAL: NEGATIVE for significant arthralgias or myalgia  NEURO: NEGATIVE for weakness, dizziness or paresthesias  PSYCHIATRIC: NEGATIVE for changes in mood or affect    OBJECTIVE:   /72 (BP Location: Left arm, Patient Position: Sitting, Cuff Size: Adult Large)   Pulse 92   Ht 1.784 m (5' 10.25\")   Wt 127.5 kg (281 lb)   BMI 40.03 kg/m      Physical Exam  GENERAL: healthy, alert and no distress  EYES: Eyes grossly normal to inspection, PERRL and conjunctivae and sclerae normal  HENT: ear canals and TM's normal, nose and mouth without ulcers or lesions  NECK: no adenopathy, no asymmetry, masses, or scars and thyroid normal to palpation  RESP: lungs clear to auscultation - no rales, rhonchi or wheezes  CV: regular rate and rhythm, normal S1 S2, no S3 or S4, no murmur, click or rub, no peripheral edema and peripheral pulses strong  ABDOMEN: soft, nontender, no hepatosplenomegaly, no masses and bowel sounds normal  MS: no gross musculoskeletal defects noted, no edema  SKIN: no suspicious lesions or rashes  NEURO: Normal strength and tone, mentation intact and speech normal  PSYCH: mentation appears normal, affect normal/bright    Diagnostic Test Results:  Labs reviewed in Epic  No results found for any visits on 02/21/22.    Ascencion Farias MD  [facility]  "

## 2022-02-22 PROBLEM — J30.2 SEASONAL ALLERGIC RHINITIS: Status: ACTIVE | Noted: 2022-02-22

## 2022-02-22 ASSESSMENT — ANXIETY QUESTIONNAIRES: GAD7 TOTAL SCORE: 0

## 2022-03-02 ENCOUNTER — TELEPHONE (OUTPATIENT)
Dept: LAB | Facility: CLINIC | Age: 37
End: 2022-03-02
Payer: COMMERCIAL

## 2022-03-02 DIAGNOSIS — Z13.1 SCREENING FOR DIABETES MELLITUS: Primary | ICD-10-CM

## 2022-03-02 DIAGNOSIS — Z13.220 SCREENING, LIPID: ICD-10-CM

## 2022-03-02 NOTE — PROGRESS NOTES
Patient has a lab only appointment on 3/8/22 and there are no orders in his chart.  He had an OV with you on 2/21.  Please review and place future orders.  Thanks!

## 2022-03-08 ENCOUNTER — LAB (OUTPATIENT)
Dept: LAB | Facility: CLINIC | Age: 37
End: 2022-03-08
Payer: COMMERCIAL

## 2022-03-08 DIAGNOSIS — Z13.220 SCREENING, LIPID: ICD-10-CM

## 2022-03-08 DIAGNOSIS — Z13.1 SCREENING FOR DIABETES MELLITUS: ICD-10-CM

## 2022-03-08 LAB
ANION GAP SERPL CALCULATED.3IONS-SCNC: 11 MMOL/L (ref 5–18)
BUN SERPL-MCNC: 14 MG/DL (ref 8–22)
CALCIUM SERPL-MCNC: 9.9 MG/DL (ref 8.5–10.5)
CHLORIDE BLD-SCNC: 104 MMOL/L (ref 98–107)
CHOLEST SERPL-MCNC: 190 MG/DL
CO2 SERPL-SCNC: 26 MMOL/L (ref 22–31)
CREAT SERPL-MCNC: 0.92 MG/DL (ref 0.7–1.3)
FASTING STATUS PATIENT QL REPORTED: YES
GFR SERPL CREATININE-BSD FRML MDRD: >90 ML/MIN/1.73M2
GLUCOSE BLD-MCNC: 96 MG/DL (ref 70–125)
HDLC SERPL-MCNC: 49 MG/DL
LDLC SERPL CALC-MCNC: 126 MG/DL
POTASSIUM BLD-SCNC: 4.6 MMOL/L (ref 3.5–5)
SODIUM SERPL-SCNC: 141 MMOL/L (ref 136–145)
TRIGL SERPL-MCNC: 74 MG/DL

## 2022-03-08 PROCEDURE — 80061 LIPID PANEL: CPT

## 2022-03-08 PROCEDURE — 80048 BASIC METABOLIC PNL TOTAL CA: CPT

## 2022-03-08 PROCEDURE — 36415 COLL VENOUS BLD VENIPUNCTURE: CPT

## 2022-10-01 ENCOUNTER — HEALTH MAINTENANCE LETTER (OUTPATIENT)
Age: 37
End: 2022-10-01

## 2023-05-14 ENCOUNTER — HEALTH MAINTENANCE LETTER (OUTPATIENT)
Age: 38
End: 2023-05-14

## 2024-07-21 ENCOUNTER — HEALTH MAINTENANCE LETTER (OUTPATIENT)
Age: 39
End: 2024-07-21